# Patient Record
Sex: MALE | Race: WHITE | NOT HISPANIC OR LATINO
[De-identification: names, ages, dates, MRNs, and addresses within clinical notes are randomized per-mention and may not be internally consistent; named-entity substitution may affect disease eponyms.]

---

## 2023-01-18 PROBLEM — Z00.00 ENCOUNTER FOR PREVENTIVE HEALTH EXAMINATION: Status: ACTIVE | Noted: 2023-01-18

## 2023-01-31 ENCOUNTER — APPOINTMENT (OUTPATIENT)
Dept: UROLOGY | Facility: CLINIC | Age: 38
End: 2023-01-31
Payer: COMMERCIAL

## 2023-01-31 ENCOUNTER — NON-APPOINTMENT (OUTPATIENT)
Age: 38
End: 2023-01-31

## 2023-01-31 ENCOUNTER — OUTPATIENT (OUTPATIENT)
Dept: OUTPATIENT SERVICES | Facility: HOSPITAL | Age: 38
LOS: 1 days | End: 2023-01-31
Payer: COMMERCIAL

## 2023-01-31 VITALS
TEMPERATURE: 97.7 F | OXYGEN SATURATION: 98 % | DIASTOLIC BLOOD PRESSURE: 88 MMHG | WEIGHT: 155 LBS | SYSTOLIC BLOOD PRESSURE: 130 MMHG | BODY MASS INDEX: 24.33 KG/M2 | HEART RATE: 85 BPM | HEIGHT: 67 IN

## 2023-01-31 DIAGNOSIS — E29.1 TESTICULAR HYPOFUNCTION: ICD-10-CM

## 2023-01-31 PROCEDURE — 99204 OFFICE O/P NEW MOD 45 MIN: CPT | Mod: 25,57

## 2023-01-31 PROCEDURE — 71046 X-RAY EXAM CHEST 2 VIEWS: CPT | Mod: 26

## 2023-01-31 PROCEDURE — 93975 VASCULAR STUDY: CPT

## 2023-01-31 PROCEDURE — 71046 X-RAY EXAM CHEST 2 VIEWS: CPT

## 2023-02-01 NOTE — PHYSICAL EXAM
[Penis Abnormality] : normal circumcised penis [Scrotum] : the scrotum was normal [Epididymis] : the epididymides were normal [Testes Tenderness] : no tenderness of the testes [Testes Mass (___cm)] : there were no testicular masses [FreeTextEntry1] : Hypospadic meatus. Bilateral 18 cc testis. Left Grade II Varicocele.

## 2023-02-01 NOTE — ASSESSMENT
[FreeTextEntry1] : bilateral varicocele\par Hypogonadism\par TT LH FSH E2\par Sonogram today confirms\par We had an in depth conversation regarding the benefit of varicocelectomy today. He understands the literature which overwhelming reports a benefit in improvement in semen parameters. He also understands that there is limited data in terms of spontaneous pregnancy and take home baby rates.There is good data suggesting improved semen parameters which can possibly obviate the need for future IVF and has been shown to improve IVF outcomes in selected patients. We discussed the recent results of a [facility] review which reported that one natural pregnancy is achieved for every 3-5 varicocele repairs over an unknown time frame. We also spoke about the fact that there is a 3 to 6 month delay before improvement is seen in semen parameters. The role of advancing maternal age was discussed in depth.\par \par Surgical risks, including a risk of infection, injury to the testicular artery (extremely rare), injury to the vas deferens and hydrocele were discussed, as was post operative pain and pain control. Post operative skin numbness in the anterior thigh/lateral scrotum were also discussed.\par will schedule

## 2023-02-01 NOTE — HISTORY OF PRESENT ILLNESS
[FreeTextEntry1] : 36 yo. No PMH. Had a surgery for hypospadia when he was a child. \par  for 1.5 years to Marie Morris, 32 yo. Has endometriosis, had laparoscopic surgery December 2017 on pills until July 2022. \par Referred by Dr. Parikh. \par \par Brings SA 01/2023\par 1 ml/6.5 mill/ml/46.5% motility/1 % morphology. \par \par No ED. No EjD.\par No other complaint. \par \par

## 2023-02-10 NOTE — ASU PATIENT PROFILE, ADULT - NS PREOP UNDERSTANDS INFO
No solid food/dairy/candy/gum after 10:00pm Sunday; water before 05:00am Monday; Patient to bring photo ID/insurance card; no jewelries/valuables/contact lens; no smoking/alcohol drinking/drug use Sunday; escort to come with photo ID; address and callback number was given./yes

## 2023-02-10 NOTE — ASU PATIENT PROFILE, ADULT - FALL HARM RISK - UNIVERSAL INTERVENTIONS
Bed in lowest position, wheels locked, appropriate side rails in place/Call bell, personal items and telephone in reach/Instruct patient to call for assistance before getting out of bed or chair/Non-slip footwear when patient is out of bed/[place] to call system/Physically safe environment - no spills, clutter or unnecessary equipment/Purposeful Proactive Rounding/Room/bathroom lighting operational, light cord in reach

## 2023-02-12 ENCOUNTER — TRANSCRIPTION ENCOUNTER (OUTPATIENT)
Age: 38
End: 2023-02-12

## 2023-02-13 ENCOUNTER — TRANSCRIPTION ENCOUNTER (OUTPATIENT)
Age: 38
End: 2023-02-13

## 2023-02-13 ENCOUNTER — OUTPATIENT (OUTPATIENT)
Dept: OUTPATIENT SERVICES | Facility: HOSPITAL | Age: 38
LOS: 1 days | Discharge: HOME | End: 2023-02-13
Payer: COMMERCIAL

## 2023-02-13 ENCOUNTER — APPOINTMENT (OUTPATIENT)
Dept: UROLOGY | Facility: AMBULATORY SURGERY CENTER | Age: 38
End: 2023-02-13

## 2023-02-13 VITALS
SYSTOLIC BLOOD PRESSURE: 126 MMHG | OXYGEN SATURATION: 100 % | HEIGHT: 67 IN | RESPIRATION RATE: 16 BRPM | DIASTOLIC BLOOD PRESSURE: 89 MMHG | HEART RATE: 78 BPM | TEMPERATURE: 97 F | WEIGHT: 167.99 LBS

## 2023-02-13 VITALS
SYSTOLIC BLOOD PRESSURE: 146 MMHG | DIASTOLIC BLOOD PRESSURE: 94 MMHG | RESPIRATION RATE: 16 BRPM | HEART RATE: 91 BPM | TEMPERATURE: 98 F | OXYGEN SATURATION: 100 %

## 2023-02-13 LAB — SARS-COV-2 N GENE NPH QL NAA+PROBE: DETECTED

## 2023-02-13 PROCEDURE — 55535 REVISE SPERMATIC CORD VEINS: CPT | Mod: 50

## 2023-02-13 DEVICE — CLIP APPLIER COVIDIEN SURGICLIP 13" LARGE: Type: IMPLANTABLE DEVICE | Status: FUNCTIONAL

## 2023-02-13 DEVICE — CLIP APPLIER ETHICON LIGACLIP 9 3/8" SMALL: Type: IMPLANTABLE DEVICE | Status: FUNCTIONAL

## 2023-02-13 RX ORDER — DOCUSATE SODIUM 100 MG
1 CAPSULE ORAL
Qty: 8 | Refills: 0
Start: 2023-02-13 | End: 2023-02-16

## 2023-02-13 RX ORDER — ACETAMINOPHEN WITH CODEINE 300MG-30MG
1 TABLET ORAL
Qty: 20 | Refills: 0
Start: 2023-02-13 | End: 2023-02-17

## 2023-02-13 RX ORDER — APREPITANT 80 MG/1
40 CAPSULE ORAL ONCE
Refills: 0 | Status: COMPLETED | OUTPATIENT
Start: 2023-02-13 | End: 2023-02-13

## 2023-02-13 RX ORDER — ACETAMINOPHEN 500 MG
1000 TABLET ORAL ONCE
Refills: 0 | Status: COMPLETED | OUTPATIENT
Start: 2023-02-13 | End: 2023-02-13

## 2023-02-13 RX ORDER — SODIUM CHLORIDE 9 MG/ML
1000 INJECTION, SOLUTION INTRAVENOUS
Refills: 0 | Status: DISCONTINUED | OUTPATIENT
Start: 2023-02-13 | End: 2023-02-13

## 2023-02-13 RX ORDER — FENTANYL CITRATE 50 UG/ML
50 INJECTION INTRAVENOUS
Refills: 0 | Status: DISCONTINUED | OUTPATIENT
Start: 2023-02-13 | End: 2023-02-13

## 2023-02-13 RX ORDER — OXYCODONE HYDROCHLORIDE 5 MG/1
5 TABLET ORAL ONCE
Refills: 0 | Status: DISCONTINUED | OUTPATIENT
Start: 2023-02-13 | End: 2023-02-13

## 2023-02-13 RX ORDER — KETOROLAC TROMETHAMINE 30 MG/ML
15 SYRINGE (ML) INJECTION ONCE
Refills: 0 | Status: DISCONTINUED | OUTPATIENT
Start: 2023-02-13 | End: 2023-02-13

## 2023-02-13 RX ORDER — ONDANSETRON 8 MG/1
4 TABLET, FILM COATED ORAL ONCE
Refills: 0 | Status: DISCONTINUED | OUTPATIENT
Start: 2023-02-13 | End: 2023-02-13

## 2023-02-13 RX ORDER — FENTANYL CITRATE 50 UG/ML
25 INJECTION INTRAVENOUS
Refills: 0 | Status: DISCONTINUED | OUTPATIENT
Start: 2023-02-13 | End: 2023-02-13

## 2023-02-13 RX ADMIN — SODIUM CHLORIDE 100 MILLILITER(S): 9 INJECTION, SOLUTION INTRAVENOUS at 14:11

## 2023-02-13 RX ADMIN — SODIUM CHLORIDE 100 MILLILITER(S): 9 INJECTION, SOLUTION INTRAVENOUS at 13:21

## 2023-02-13 RX ADMIN — Medication 1000 MILLIGRAM(S): at 09:45

## 2023-02-13 RX ADMIN — OXYCODONE HYDROCHLORIDE 5 MILLIGRAM(S): 5 TABLET ORAL at 14:41

## 2023-02-13 RX ADMIN — APREPITANT 40 MILLIGRAM(S): 80 CAPSULE ORAL at 09:45

## 2023-02-13 RX ADMIN — OXYCODONE HYDROCHLORIDE 5 MILLIGRAM(S): 5 TABLET ORAL at 15:11

## 2023-02-13 NOTE — ASU DISCHARGE PLAN (ADULT/PEDIATRIC) - CARE PROVIDER_API CALL
Angy Veliz  Urology  [address], [facility] [place] [address]  Phone: [phone]  Fax: [phone]  Follow Up Time: 2 weeks

## 2023-02-13 NOTE — ASU DISCHARGE PLAN (ADULT/PEDIATRIC) - ASU DC SPECIAL INSTRUCTIONSFT
VARICOCELECTOMY    SURGICAL WOUND: There are often lumps and bumps that can be felt in the scrotum on either or both sides up to two (2) months or more post operatively. These are of no concern and with time they will soften and disappear.  Any “black and blue” bruising areas are expected and will also resolve over weeks.  Normally, there is also swelling of the scrotum post operatively. Sometimes the tissue fluid which causes the swelling migrates to the penile skin and can look alarming; with time, all the swelling will eventually subside but may take weeks.  A scrotal support and scrotal fluffs should be worn at all times for the next few weeks, unless bathing, to minimize this swelling. You may apply an ice-pack for 15 minutes out of every hour for the first 24 -36 hours to minimize pain and swelling.    STITCHES: The stitches in the incision will dissolve and fall out by themselves. Sometimes skin stitches may open, allowing a slight gaping of the incision. This is no problem if you keep the area clean.  There may be a bluish colored waterproof glue over the incision as well – the glue will peel away and fall off on its own over a couple of weeks.     PAIN: You may have some intermittent pain for up to six (6) weeks post operatively. Pain does not signify any problem unless associated with fever, chills, or inability to void.  If you experience any fevers or chills please call immediately as this may be signs of an infection. You may take Tylenol (acetaminophen) 650-975mg and/or Motrin (ibuprofen) 400-600mg, both available over the counter, for pain every 6 hours as needed. Do not exceed 4000mg of Tylenol (acetaminophen) daily. You may alternate these medications such that you take one or the other every 3 hours for around the clock pain coverage. For severe pain, take Percocet 5/325mg every 6 hours.     ANTIBIOTICS: You may be given a prescription for an antibiotic, please take this medication as instructed and be sure to complete the entire course.     STOOL SOFTENERS: Do not allow yourself to become constipated as straining may cause bleeding. Take stool softeners or a laxative (ex. Miralax, Colace, Senokot, ExLax, etc), available over the counter, if taking Percocet.     ANTICOAGULATION: If you are taking any blood thinning medications, please discuss with your urologist prior to restarting these medications unless otherwise specified.    BATHING: You may sponge bath 24 hours after surgery, but minimize water to the surgical incision and drain.    DIET: You may resume your regular diet and regular medication regimen.    ACTIVITY: No heavy lifting or strenuous exercise until you are evaluated at your post-operative appointment. Otherwise, you may return to your usual level of physical activity.    FOLLOW-UP: In 2 weeks with Dr Veliz. If you did not already schedule your post-operative appointment, please call your urologist to schedule and follow-up appointment.    CALL YOUR UROLOGIST IF: You have any bleeding that does not stop, inability to void >8 hours, fever over 100.4 F, chills, persistent nausea/vomiting, changes in your incision concerning for infection, or if your pain is not controlled on your discharge pain medications.

## 2023-02-25 PROBLEM — Z78.9 OTHER SPECIFIED HEALTH STATUS: Chronic | Status: ACTIVE | Noted: 2023-02-10

## 2023-02-28 ENCOUNTER — APPOINTMENT (OUTPATIENT)
Dept: UROLOGY | Facility: CLINIC | Age: 38
End: 2023-02-28
Payer: COMMERCIAL

## 2023-02-28 VITALS — DIASTOLIC BLOOD PRESSURE: 83 MMHG | HEART RATE: 70 BPM | TEMPERATURE: 98.2 F | SYSTOLIC BLOOD PRESSURE: 125 MMHG

## 2023-02-28 DIAGNOSIS — I86.1 SCROTAL VARICES: ICD-10-CM

## 2023-02-28 PROCEDURE — 99024 POSTOP FOLLOW-UP VISIT: CPT

## 2023-03-01 NOTE — HISTORY OF PRESENT ILLNESS
[FreeTextEntry1] : 2 weeks poostop\par pain improving\par incisions clean and dry\par no hematoma\par mild swelling\par no vaircoclee\par SA 3- 6months

## 2023-05-19 ENCOUNTER — APPOINTMENT (OUTPATIENT)
Dept: UROLOGY | Facility: CLINIC | Age: 38
End: 2023-05-19
Payer: COMMERCIAL

## 2023-05-19 VITALS — SYSTOLIC BLOOD PRESSURE: 130 MMHG | DIASTOLIC BLOOD PRESSURE: 86 MMHG | HEART RATE: 83 BPM | TEMPERATURE: 97.7 F

## 2023-05-19 PROCEDURE — 99213 OFFICE O/P EST LOW 20 MIN: CPT

## 2023-05-19 NOTE — PHYSICAL EXAM
[Normal Appearance] : normal appearance [General Appearance - In No Acute Distress] : no acute distress [Heart Rate And Rhythm] : Heart rate and rhythm were normal [Edema] : no peripheral edema [] : no respiratory distress [Respiration, Rhythm And Depth] : normal respiratory rhythm and effort [Exaggerated Use Of Accessory Muscles For Inspiration] : no accessory muscle use [Oriented To Time, Place, And Person] : oriented to person, place, and time [Affect] : the affect was normal [Normal Station and Gait] : the gait and station were normal for the patient's age

## 2023-05-20 NOTE — ASSESSMENT
[FreeTextEntry1] : 37M here for follow up s/p varicocelectomy \par \par b/l varicocele\par improving SA\par reviewed role ART\par repeat SA 3months

## 2023-05-20 NOTE — HISTORY OF PRESENT ILLNESS
[FreeTextEntry1] : 37M here for follow up for varicocele correction s/p 3 months\par baseline:\par 1 ml/6.5 mill/ml/46.5% motility/1 % morphology. \par - patient returned with 3 month SA\par 10 mill/ml / 50% motile / 5 total motile\par \par - patient and wife planning for natural conception, may lean toward ART\par - moving to [place] in 2 weeks\par - patinet healed well from procedure

## 2024-02-08 ENCOUNTER — NON-APPOINTMENT (OUTPATIENT)
Age: 39
End: 2024-02-08

## (undated) DEVICE — CATH IV SAFE BC 24G X 0.75" (YELLOW)

## (undated) DEVICE — SUT VICRYL 3-0 27" SH UNDYED

## (undated) DEVICE — SYR LUER LOK 3CC

## (undated) DEVICE — DRAPE MICROSCOPE ZEISS

## (undated) DEVICE — ELCTR BIPOLAR CORD 12FT

## (undated) DEVICE — SLV COMPRESSION KNEE MED

## (undated) DEVICE — DRSG KERLIX ROLL 4.5"

## (undated) DEVICE — SYR LUER LOK 10CC

## (undated) DEVICE — SUT SILK 4-0 18" TIES

## (undated) DEVICE — DRSG SUPPORTER ADULT 3" WAISTBAND LRG

## (undated) DEVICE — DRAIN PENROSE 5/8" X 18" LATEX

## (undated) DEVICE — SUT MONOCRYL 4-0 27" PS-2 UNDYED

## (undated) DEVICE — VESSEL LOOP MINI-BLUE 0.075" X 16"

## (undated) DEVICE — PACK GENERAL MINOR

## (undated) DEVICE — WARMING BLANKET UPPER ADULT

## (undated) DEVICE — DOPPLER PROBE 20MHZ DISP

## (undated) DEVICE — GLV 7.5 PROTEXIS (WHITE)